# Patient Record
Sex: FEMALE | HISPANIC OR LATINO | Employment: FULL TIME | URBAN - NONMETROPOLITAN AREA
[De-identification: names, ages, dates, MRNs, and addresses within clinical notes are randomized per-mention and may not be internally consistent; named-entity substitution may affect disease eponyms.]

---

## 2017-02-03 ENCOUNTER — OFFICE VISIT (OUTPATIENT)
Dept: RETAIL CLINIC | Facility: CLINIC | Age: 22
End: 2017-02-03

## 2017-02-03 ENCOUNTER — APPOINTMENT (OUTPATIENT)
Dept: CT IMAGING | Facility: HOSPITAL | Age: 22
End: 2017-02-03

## 2017-02-03 ENCOUNTER — HOSPITAL ENCOUNTER (EMERGENCY)
Facility: HOSPITAL | Age: 22
Discharge: HOME | End: 2017-02-03
Admitting: EMERGENCY MEDICINE

## 2017-02-03 VITALS
WEIGHT: 138.4 LBS | BODY MASS INDEX: 27.9 KG/M2 | RESPIRATION RATE: 18 BRPM | HEIGHT: 59 IN | HEART RATE: 100 BPM | DIASTOLIC BLOOD PRESSURE: 64 MMHG | SYSTOLIC BLOOD PRESSURE: 96 MMHG | TEMPERATURE: 100.3 F | OXYGEN SATURATION: 98 %

## 2017-02-03 VITALS
OXYGEN SATURATION: 98 % | WEIGHT: 141 LBS | HEART RATE: 99 BPM | TEMPERATURE: 99 F | DIASTOLIC BLOOD PRESSURE: 52 MMHG | SYSTOLIC BLOOD PRESSURE: 98 MMHG | BODY MASS INDEX: 28.43 KG/M2 | HEIGHT: 59 IN | RESPIRATION RATE: 18 BRPM

## 2017-02-03 DIAGNOSIS — N91.2 AMENORRHEA: ICD-10-CM

## 2017-02-03 DIAGNOSIS — N76.0 ACUTE VAGINITIS: ICD-10-CM

## 2017-02-03 DIAGNOSIS — R10.31 RIGHT LOWER QUADRANT ABDOMINAL PAIN: ICD-10-CM

## 2017-02-03 DIAGNOSIS — J03.00 STREP TONSILLITIS: Primary | ICD-10-CM

## 2017-02-03 DIAGNOSIS — R10.9 RIGHT FLANK PAIN: ICD-10-CM

## 2017-02-03 DIAGNOSIS — J02.0 STREP PHARYNGITIS: Primary | ICD-10-CM

## 2017-02-03 LAB
ALBUMIN SERPL-MCNC: 4.4 G/DL (ref 3.5–5)
ALBUMIN/GLOB SERPL: 1.2 G/DL (ref 1.1–2.5)
ALP SERPL-CCNC: 72 U/L (ref 24–120)
ALT SERPL W P-5'-P-CCNC: 39 U/L (ref 0–54)
AMYLASE SERPL-CCNC: 57 U/L (ref 30–110)
ANION GAP SERPL CALCULATED.3IONS-SCNC: 8 MMOL/L (ref 4–13)
AST SERPL-CCNC: 28 U/L (ref 7–45)
B-HCG UR QL: NEGATIVE
BACTERIA UR QL AUTO: ABNORMAL /HPF
BASOPHILS # BLD AUTO: 0.01 10*3/MM3 (ref 0–0.2)
BASOPHILS NFR BLD AUTO: 0.1 % (ref 0–2)
BILIRUB BLD-MCNC: NEGATIVE MG/DL
BILIRUB SERPL-MCNC: 1.1 MG/DL (ref 0.1–1)
BILIRUB UR QL STRIP: NEGATIVE
BUN BLD-MCNC: 8 MG/DL (ref 5–21)
BUN/CREAT SERPL: 14.8 (ref 7–25)
CALCIUM SPEC-SCNC: 8.9 MG/DL (ref 8.4–10.4)
CHLORIDE SERPL-SCNC: 103 MMOL/L (ref 98–110)
CLARITY UR: CLEAR
CLARITY, POC: CLEAR
CLUE CELLS SPEC QL WET PREP: ABNORMAL
CO2 SERPL-SCNC: 26 MMOL/L (ref 24–31)
COLOR UR: ABNORMAL
COLOR UR: YELLOW
CREAT BLD-MCNC: 0.54 MG/DL (ref 0.5–1.4)
DEPRECATED RDW RBC AUTO: 39.3 FL (ref 40–54)
EOSINOPHIL # BLD AUTO: 0.01 10*3/MM3 (ref 0–0.7)
EOSINOPHIL NFR BLD AUTO: 0.1 % (ref 0–4)
ERYTHROCYTE [DISTWIDTH] IN BLOOD BY AUTOMATED COUNT: 12 % (ref 12–15)
EXPIRATION DATE: ABNORMAL
EXPIRATION DATE: NORMAL
FLUAV AG NPH QL: NEGATIVE
FLUBV AG NPH QL: NEGATIVE
GFR SERPL CREATININE-BSD FRML MDRD: 140 ML/MIN/1.73
GLOBULIN UR ELPH-MCNC: 3.8 GM/DL
GLUCOSE BLD-MCNC: 109 MG/DL (ref 70–100)
GLUCOSE UR STRIP-MCNC: NEGATIVE MG/DL
GLUCOSE UR STRIP-MCNC: NEGATIVE MG/DL
HCG INTACT+B SERPL-ACNC: <2.39 MIU/ML (ref 0–10)
HCT VFR BLD AUTO: 36.8 % (ref 37–47)
HETEROPH AB SER QL LA: NEGATIVE
HGB BLD-MCNC: 12.7 G/DL (ref 12–16)
HGB UR QL STRIP.AUTO: ABNORMAL
HYALINE CASTS UR QL AUTO: ABNORMAL /LPF
HYDATID CYST SPEC WET PREP: ABNORMAL
IMM GRANULOCYTES # BLD: 0.05 10*3/MM3 (ref 0–0.03)
IMM GRANULOCYTES NFR BLD: 0.3 % (ref 0–5)
INTERNAL CONTROL: ABNORMAL
INTERNAL CONTROL: NORMAL
INTERNAL NEGATIVE CONTROL: NEGATIVE
INTERNAL POSITIVE CONTROL: POSITIVE
KETONES UR QL STRIP: ABNORMAL
KETONES UR QL: ABNORMAL
LEUKOCYTE EST, POC: NEGATIVE
LEUKOCYTE ESTERASE UR QL STRIP.AUTO: ABNORMAL
LIPASE SERPL-CCNC: 25 U/L (ref 23–203)
LYMPHOCYTES # BLD AUTO: 1.76 10*3/MM3 (ref 0.72–4.86)
LYMPHOCYTES NFR BLD AUTO: 11.6 % (ref 15–45)
Lab: ABNORMAL
Lab: NORMAL
Lab: NORMAL
MCH RBC QN AUTO: 31.2 PG (ref 28–32)
MCHC RBC AUTO-ENTMCNC: 34.5 G/DL (ref 33–36)
MCV RBC AUTO: 90.4 FL (ref 82–98)
MONOCYTES # BLD AUTO: 0.89 10*3/MM3 (ref 0.19–1.3)
MONOCYTES NFR BLD AUTO: 5.9 % (ref 4–12)
NEUTROPHILS # BLD AUTO: 12.39 10*3/MM3 (ref 1.87–8.4)
NEUTROPHILS NFR BLD AUTO: 82 % (ref 39–78)
NITRITE UR QL STRIP: NEGATIVE
NITRITE UR-MCNC: NEGATIVE MG/ML
PH UR STRIP.AUTO: <=5 [PH] (ref 5–8)
PH UR: 5 [PH] (ref 5–8)
PLATELET # BLD AUTO: 269 10*3/MM3 (ref 130–400)
PMV BLD AUTO: 9.5 FL (ref 6–12)
POTASSIUM BLD-SCNC: 3.4 MMOL/L (ref 3.5–5.3)
PROT SERPL-MCNC: 8.2 G/DL (ref 6.3–8.7)
PROT UR QL STRIP: NEGATIVE
PROT UR STRIP-MCNC: NEGATIVE MG/DL
RBC # BLD AUTO: 4.07 10*6/MM3 (ref 4.2–5.4)
RBC # UR STRIP: ABNORMAL /UL
RBC # UR: ABNORMAL /HPF
REF LAB TEST METHOD: ABNORMAL
S PYO AG THROAT QL: POSITIVE
SODIUM BLD-SCNC: 137 MMOL/L (ref 135–145)
SP GR UR STRIP: 1.02 (ref 1–1.03)
SP GR UR: 1.02 (ref 1–1.03)
SQUAMOUS #/AREA URNS HPF: ABNORMAL /HPF
T VAGINALIS SPEC QL WET PREP: ABNORMAL
UROBILINOGEN UR QL STRIP: ABNORMAL
UROBILINOGEN UR QL: NORMAL
WBC NRBC COR # BLD: 15.11 10*3/MM3 (ref 4.8–10.8)
WBC SPEC QL WET PREP: ABNORMAL
WBC UR QL AUTO: ABNORMAL /HPF
YEAST GENITAL QL WET PREP: ABNORMAL

## 2017-02-03 PROCEDURE — 80053 COMPREHEN METABOLIC PANEL: CPT | Performed by: PHYSICIAN ASSISTANT

## 2017-02-03 PROCEDURE — 25010000002 ONDANSETRON PER 1 MG: Performed by: PHYSICIAN ASSISTANT

## 2017-02-03 PROCEDURE — 87270 CHLAMYDIA TRACHOMATIS AG IF: CPT | Performed by: PHYSICIAN ASSISTANT

## 2017-02-03 PROCEDURE — 82150 ASSAY OF AMYLASE: CPT | Performed by: PHYSICIAN ASSISTANT

## 2017-02-03 PROCEDURE — 87590 N.GONORRHOEAE DNA DIR PROB: CPT | Performed by: PHYSICIAN ASSISTANT

## 2017-02-03 PROCEDURE — 87491 CHLMYD TRACH DNA AMP PROBE: CPT | Performed by: PHYSICIAN ASSISTANT

## 2017-02-03 PROCEDURE — 85025 COMPLETE CBC W/AUTO DIFF WBC: CPT | Performed by: PHYSICIAN ASSISTANT

## 2017-02-03 PROCEDURE — 87880 STREP A ASSAY W/OPTIC: CPT | Performed by: NURSE PRACTITIONER

## 2017-02-03 PROCEDURE — 96365 THER/PROPH/DIAG IV INF INIT: CPT

## 2017-02-03 PROCEDURE — 25010000002 MORPHINE PER 10 MG: Performed by: EMERGENCY MEDICINE

## 2017-02-03 PROCEDURE — 87210 SMEAR WET MOUNT SALINE/INK: CPT | Performed by: PHYSICIAN ASSISTANT

## 2017-02-03 PROCEDURE — 81003 URINALYSIS AUTO W/O SCOPE: CPT | Performed by: NURSE PRACTITIONER

## 2017-02-03 PROCEDURE — 83690 ASSAY OF LIPASE: CPT | Performed by: PHYSICIAN ASSISTANT

## 2017-02-03 PROCEDURE — 96375 TX/PRO/DX INJ NEW DRUG ADDON: CPT

## 2017-02-03 PROCEDURE — 86308 HETEROPHILE ANTIBODY SCREEN: CPT | Performed by: PHYSICIAN ASSISTANT

## 2017-02-03 PROCEDURE — 81025 URINE PREGNANCY TEST: CPT | Performed by: NURSE PRACTITIONER

## 2017-02-03 PROCEDURE — 99283 EMERGENCY DEPT VISIT LOW MDM: CPT

## 2017-02-03 PROCEDURE — 96361 HYDRATE IV INFUSION ADD-ON: CPT

## 2017-02-03 PROCEDURE — 0 IOPAMIDOL 61 % SOLUTION: Performed by: PHYSICIAN ASSISTANT

## 2017-02-03 PROCEDURE — 87804 INFLUENZA ASSAY W/OPTIC: CPT | Performed by: NURSE PRACTITIONER

## 2017-02-03 PROCEDURE — 81001 URINALYSIS AUTO W/SCOPE: CPT | Performed by: PHYSICIAN ASSISTANT

## 2017-02-03 PROCEDURE — 84702 CHORIONIC GONADOTROPIN TEST: CPT | Performed by: PHYSICIAN ASSISTANT

## 2017-02-03 PROCEDURE — 74177 CT ABD & PELVIS W/CONTRAST: CPT

## 2017-02-03 PROCEDURE — 99203 OFFICE O/P NEW LOW 30 MIN: CPT | Performed by: NURSE PRACTITIONER

## 2017-02-03 PROCEDURE — 87591 N.GONORRHOEAE DNA AMP PROB: CPT

## 2017-02-03 RX ORDER — CLINDAMYCIN PHOSPHATE 900 MG/50ML
900 INJECTION INTRAVENOUS EVERY 8 HOURS
Status: DISCONTINUED | OUTPATIENT
Start: 2017-02-03 | End: 2017-02-03 | Stop reason: HOSPADM

## 2017-02-03 RX ORDER — POTASSIUM CHLORIDE 20 MEQ/1
20 TABLET, EXTENDED RELEASE ORAL ONCE
Status: DISCONTINUED | OUTPATIENT
Start: 2017-02-03 | End: 2017-02-03 | Stop reason: CLARIF

## 2017-02-03 RX ORDER — SODIUM CHLORIDE 9 MG/ML
125 INJECTION, SOLUTION INTRAVENOUS CONTINUOUS
Status: DISCONTINUED | OUTPATIENT
Start: 2017-02-03 | End: 2017-02-03 | Stop reason: HOSPADM

## 2017-02-03 RX ORDER — POTASSIUM CHLORIDE 750 MG/1
20 CAPSULE, EXTENDED RELEASE ORAL ONCE
Status: COMPLETED | OUTPATIENT
Start: 2017-02-03 | End: 2017-02-03

## 2017-02-03 RX ORDER — ACETAMINOPHEN AND CODEINE PHOSPHATE 300; 30 MG/1; MG/1
1 TABLET ORAL EVERY 4 HOURS PRN
Qty: 15 TABLET | Refills: 0 | Status: SHIPPED | OUTPATIENT
Start: 2017-02-03 | End: 2017-10-27

## 2017-02-03 RX ORDER — MORPHINE SULFATE 4 MG/ML
4 INJECTION, SOLUTION INTRAMUSCULAR; INTRAVENOUS ONCE
Status: COMPLETED | OUTPATIENT
Start: 2017-02-03 | End: 2017-02-03

## 2017-02-03 RX ORDER — METRONIDAZOLE 7.5 MG/G
GEL VAGINAL EVERY EVENING
Qty: 70 G | Refills: 0 | Status: SHIPPED | OUTPATIENT
Start: 2017-02-03 | End: 2017-10-27

## 2017-02-03 RX ORDER — CLINDAMYCIN HYDROCHLORIDE 300 MG/1
300 CAPSULE ORAL 4 TIMES DAILY
Qty: 40 CAPSULE | Refills: 0 | Status: SHIPPED | OUTPATIENT
Start: 2017-02-03 | End: 2017-10-27

## 2017-02-03 RX ORDER — ONDANSETRON 2 MG/ML
4 INJECTION INTRAMUSCULAR; INTRAVENOUS ONCE
Status: COMPLETED | OUTPATIENT
Start: 2017-02-03 | End: 2017-02-03

## 2017-02-03 RX ADMIN — MORPHINE SULFATE 4 MG: 4 INJECTION, SOLUTION INTRAMUSCULAR; INTRAVENOUS at 15:16

## 2017-02-03 RX ADMIN — CLINDAMYCIN PHOSPHATE 900 MG: 18 INJECTION, SOLUTION INTRAVENOUS at 16:56

## 2017-02-03 RX ADMIN — SODIUM CHLORIDE 1000 ML: 9 INJECTION, SOLUTION INTRAVENOUS at 15:16

## 2017-02-03 RX ADMIN — IOPAMIDOL 100 ML: 612 INJECTION, SOLUTION INTRAVENOUS at 16:23

## 2017-02-03 RX ADMIN — ONDANSETRON 4 MG: 2 INJECTION INTRAMUSCULAR; INTRAVENOUS at 15:16

## 2017-02-03 RX ADMIN — SODIUM CHLORIDE 125 ML/HR: 9 INJECTION, SOLUTION INTRAVENOUS at 15:16

## 2017-02-03 RX ADMIN — POTASSIUM CHLORIDE 20 MEQ: 750 CAPSULE, EXTENDED RELEASE ORAL at 16:56

## 2017-02-03 NOTE — PROGRESS NOTES
Chief Complaint   Patient presents with   • Fever   • Dizziness   • Sore Throat   • Nausea   • Back Pain     Subjective   Makayla Modi is a 21 y.o. female who presents to the clinic today with complaints of back pain which started today. She's had fever and chills the last couple of days. She also complains of low abdominal pain which started today. She states she has had nausea, but no vomiting. She has has nausea off and on for over a year.  She has had a sore throat for over a week. It does hurt to swallow at times. She admits not drinking much fluid recently.  She's had a headache that Advil hasn't helped much. She reports occasional dizziness. She has had no problems walking or talking, no syncopal episodes.      She reports her period is late. Her last period was around the 22nd of December 2016.       The following portions of the patient's history were reviewed and updated as appropriate: allergies, past family history, past medical history, past social history, past surgical history and problem list.      Current Outpatient Prescriptions:   •  IBUPROFEN PO, Take  by mouth As Needed., Disp: , Rfl:   Allergies:  Review of patient's allergies indicates no known allergies.  No past medical history on file.  No past surgical history on file.  No family history on file.  Social History   Substance Use Topics   • Smoking status: Not on file   • Smokeless tobacco: Not on file   • Alcohol use Not on file       Review of Systems  Review of Systems   Constitutional: Positive for chills and fever.   HENT: Positive for postnasal drip and sore throat. Negative for ear pain, sinus pressure, sneezing and trouble swallowing.    Respiratory: Negative for cough, shortness of breath and wheezing.    Cardiovascular: Positive for chest pain (off and on).   Gastrointestinal: Positive for abdominal pain and nausea. Negative for constipation, diarrhea and vomiting.   Genitourinary: Positive for pelvic pain. Negative for dysuria,  "frequency, urgency and vaginal bleeding.   Musculoskeletal: Positive for back pain.   Neurological: Positive for dizziness (off and on) and headaches. Negative for seizures and speech difficulty.       Objective   Visit Vitals   • BP 96/64 (BP Location: Right arm, Patient Position: Sitting, Cuff Size: Adult)   • Pulse 100   • Temp 100.3 °F (37.9 °C) (Oral)   • Resp 18   • Ht 59\" (149.9 cm)   • Wt 138 lb 6.4 oz (62.8 kg)   • LMP 12/22/2016 (Approximate)   • SpO2 98%   • BMI 27.95 kg/m2     Last 2 weights    02/03/17  1252   Weight: 138 lb 6.4 oz (62.8 kg)       Physical Exam   Constitutional: She is oriented to person, place, and time. She appears well-developed and well-nourished. She is cooperative. She appears ill (mildly). No distress.   HENT:   Head: Normocephalic and atraumatic.   Right Ear: Tympanic membrane, external ear and ear canal normal. Tympanic membrane is not perforated, not erythematous, not retracted and not bulging.   Left Ear: External ear and ear canal normal. Tympanic membrane is not perforated, not erythematous, not retracted and not bulging.   Nose: Nose normal. Right sinus exhibits no maxillary sinus tenderness and no frontal sinus tenderness. Left sinus exhibits no maxillary sinus tenderness and no frontal sinus tenderness.   Mouth/Throat: Uvula is midline and mucous membranes are normal. Posterior oropharyngeal erythema present. Tonsils are 2+ on the right. Tonsils are 2+ on the left. Tonsillar exudate (both tonsils).   Eyes: Conjunctivae, EOM and lids are normal. Pupils are equal, round, and reactive to light.   Neck: Trachea normal, normal range of motion and full passive range of motion without pain. Neck supple. No rigidity.   Cardiovascular: Regular rhythm, S1 normal, S2 normal and normal heart sounds.  Tachycardia present.    Pulmonary/Chest: Effort normal and breath sounds normal. She has no wheezes. She has no rhonchi. She has no rales. Chest wall is not dull to percussion. She " exhibits no tenderness.   Abdominal: Normal appearance and bowel sounds are normal. There is tenderness in the right lower quadrant and suprapubic area. There is CVA tenderness.   Lymphadenopathy:     She has no cervical adenopathy.   Neurological: She is alert and oriented to person, place, and time. She displays a negative Romberg sign. Coordination and gait normal.   Skin: Skin is warm, dry and intact.   Psychiatric: She has a normal mood and affect. Her speech is normal and behavior is normal.   Vitals reviewed.      Assessment/Plan     Makayla was seen today for fever, dizziness, sore throat, nausea and back pain.    Diagnoses and all orders for this visit:    Strep tonsillitis  -     POC Rapid Strep A - postive  -     POC Influenza A / B- negative    Amenorrhea  -     POC Pregnancy, Urine- negative    Right flank pain  -     POC Urinalysis Dipstick, Automated    Right lower quadrant abdominal pain      Due to her abdominal pain and flank pain I have recommended she go to ER for further evaluation.  She reports she will go to [facility] ER.  I have notified the charge nurse.  I have not treated her for the strep infection. ER aware. Patient has given me permission to call and check on her later.

## 2017-02-03 NOTE — ED NOTES
PT STATES WAS SEEN AT [facility] IN CLINIC AND DX WITH STREP WAS TOLD TO COME TO THE ER BECAUSE SHE WAS HAVING FLANK PAIN     Smiley Sousa RN  02/03/17 1418

## 2017-02-03 NOTE — ED PROVIDER NOTES
Subjective   Patient is a 21 y.o. female presenting with abdominal pain.   Abdominal Pain   Associated symptoms: chills, fatigue, nausea and sore throat    Associated symptoms: no constipation, no diarrhea and no vomiting      Patient is a pleasant 21-year-old female with chief complaint of not feeling well for one week.  She complains of overall aches, chills, sore throat, abdominal pain, and back pain.  She has headaches and dizziness as well.  She denies any neck pain. she reports her other family members have been ill similar symptoms.  She went to [facility] today and had positive strep swab.  She reports a negative pregnancy test and negative flu swab.  Due to her abdominal pain and back pain, she was advised to the ER for further evaluation.  She has tried Motrin with minimal relief.    The patient denies any dysuria, hematuria, or flank pain.  Her last known menstrual cycle 6 weeks ago.  She denies any abnormal vaginal bleeding or discharge.  She complains of nausea without any vomiting, diarrhea, or constipation.  She describes abdominal pain as sharp and stabbing.  She reports it is constant.  She does complain of decreased appetite.    Review of Systems   Constitutional: Positive for activity change, appetite change, chills and fatigue.   HENT: Positive for sore throat.    Respiratory: Negative.    Gastrointestinal: Positive for abdominal pain and nausea. Negative for constipation, diarrhea and vomiting.   Genitourinary: Negative.    Musculoskeletal: Positive for back pain.   Neurological: Positive for dizziness, weakness and headaches.       History reviewed. No pertinent past medical history.    No Known Allergies    History reviewed. No pertinent past surgical history.    History reviewed. No pertinent family history.    Social History     Social History   • Marital status: Single     Spouse name: N/A   • Number of children: N/A   • Years of education: N/A     Social History Main Topics   • Smoking  "status: Never Smoker   • Smokeless tobacco: None   • Alcohol use No   • Drug use: No   • Sexual activity: Defer     Other Topics Concern   • None     Social History Narrative   • None       Prior to Admission medications    Medication Sig Start Date End Date Taking? Authorizing Provider   IBUPROFEN PO Take  by mouth As Needed.   Yes Historical Provider, MD       Medications   sodium chloride 0.9 % infusion (125 mL/hr Intravenous New Bag 2/3/17 1516)   clindamycin (CLEOCIN) 900 mg in dextrose 5% 50 mL IVPB (premix) (900 mg Intravenous New Bag 2/3/17 1656)   sodium chloride 0.9 % bolus 1,000 mL (1,000 mL Intravenous New Bag 2/3/17 1516)   ondansetron (ZOFRAN) injection 4 mg (4 mg Intravenous Given 2/3/17 1516)   Morphine sulfate (PF) injection 4 mg (4 mg Intravenous Given 2/3/17 1516)   iopamidol (ISOVUE-300) 61 % injection 100 mL (100 mL Intravenous Given 2/3/17 1623)   potassium chloride (MICRO-K) CR capsule 20 mEq (20 mEq Oral Given 2/3/17 1656)       Visit Vitals   • BP 97/62 (BP Location: Right arm, Patient Position: Sitting)   • Pulse 106   • Temp 100.2 °F (37.9 °C) (Temporal Artery )   • Resp 16   • Ht 59\" (149.9 cm)   • Wt 141 lb (64 kg)   • LMP 12/22/2016 (Approximate)   • SpO2 98%   • BMI 28.48 kg/m2         Objective   Physical Exam   Constitutional: She is oriented to person, place, and time. She appears well-developed and well-nourished. No distress.   HENT:   Head: Normocephalic and atraumatic.   Mouth/Throat: Oropharyngeal exudate, posterior oropharyngeal edema and posterior oropharyngeal erythema present. No tonsillar abscesses.   Fluid behind both TM   Eyes: Conjunctivae and EOM are normal. Pupils are equal, round, and reactive to light.   Neck: Normal range of motion. Neck supple. No spinous process tenderness and no muscular tenderness present. No rigidity. No tracheal deviation, no edema, no erythema and normal range of motion present. No Brudzinski's sign and no Kernig's sign noted. "   Cardiovascular: Normal rate, regular rhythm, normal heart sounds and intact distal pulses.    No murmur heard.  Pulmonary/Chest: Effort normal and breath sounds normal.   Abdominal: Soft. Bowel sounds are normal. She exhibits no distension and no mass. There is tenderness in the right lower quadrant. There is CVA tenderness. There is no rebound and no guarding.       Genitourinary: Vagina normal and uterus normal. Pelvic exam was performed with patient supine. Cervix exhibits discharge and friability.   Genitourinary Comments: Extensive white to yellow discharge.  No odor noted.  No masses or lesions noted.   Musculoskeletal: Normal range of motion. She exhibits no edema.   Neurological: She is alert and oriented to person, place, and time.   Skin: Skin is warm and dry. She is not diaphoretic.   Psychiatric: She has a normal mood and affect. Her behavior is normal.   Nursing note and vitals reviewed.      Procedures         Lab Results (last 24 hours)     Procedure Component Value Units Date/Time    POC Rapid Strep A [[id number]]  (Abnormal) Collected:  02/03/17 1250    Specimen:  Other Updated:  02/03/17 1334     Rapid Strep A Screen Positive (A)      Internal Control Passed      Lot Number [id number]      Expiration Date 2018/7     POC Influenza A / B [[id number]]  (Normal) Collected:  02/03/17 1255    Specimen:  Swab Updated:  02/03/17 1336     Rapid Influenza A Ag negative      Rapid Influenza B Ag negative      Internal Control Passed      Lot Number [id number]      Expiration Date 2017-08-07     POC Pregnancy, Urine [[id number]]  (Normal) Collected:  02/03/17 1255    Specimen:  Urine Updated:  02/03/17 1349     HCG, Urine, QL Negative      Lot Number [id number]      Internal Positive Control Positive      Internal Negative Control Negative     POC Urinalysis Dipstick, Automated [[id number]]  (Abnormal) Collected:  02/03/17 1305    Specimen:  Urine Updated:  02/03/17 1338     Color Chitra      Clarity, UA Clear      Glucose,  UA Negative mg/dL      Bilirubin Negative      Ketones, UA Trace (A)       5mg/dL        Specific Gravity  1.025      Blood, UA 50 Jeb/ul (A)      pH, Urine 5.0      Protein, POC Negative mg/dL      Urobilinogen, UA Normal      Leukocytes Negative      Nitrite, UA Negative     CBC & Differential [[id number]] Collected:  02/03/17 1512    Specimen:  Blood Updated:  02/03/17 1521    Narrative:       The following orders were created for panel order CBC & Differential.  Procedure                               Abnormality         Status                     ---------                               -----------         ------                     CBC Auto Differential[[id number]]         Abnormal            Final result                 Please view results for these tests on the individual orders.    Comprehensive Metabolic Panel [[id number]]  (Abnormal) Collected:  02/03/17 1512    Specimen:  Blood Updated:  02/03/17 1533     Glucose 109 (H) mg/dL      BUN 8 mg/dL      Creatinine 0.54 mg/dL      Sodium 137 mmol/L      Potassium 3.4 (L) mmol/L      Chloride 103 mmol/L      CO2 26.0 mmol/L      Calcium 8.9 mg/dL      Total Protein 8.2 g/dL      Albumin 4.40 g/dL      ALT (SGPT) 39 U/L      AST (SGOT) 28 U/L      Alkaline Phosphatase 72 U/L      Total Bilirubin 1.1 (H) mg/dL      eGFR Non African Amer 140 mL/min/1.73      Globulin 3.8 gm/dL      A/G Ratio 1.2 g/dL      BUN/Creatinine Ratio 14.8      Anion Gap 8.0 mmol/L     Urinalysis With / Culture If Indicated [[id number]]  (Abnormal) Collected:  02/03/17 1512    Specimen:  Urine from Urine, Catheter Updated:  02/03/17 1526     Color, UA Yellow      Appearance, UA Clear      pH, UA <=5.0      Specific Gravity, UA 1.021      Glucose, UA Negative      Ketones, UA 15 mg/dL (1+) (A)      Bilirubin, UA Negative      Blood, UA Moderate (2+) (A)      Protein, UA Negative      Leuk Esterase, UA Trace (A)      Nitrite, UA Negative      Urobilinogen, UA 0.2 E.U./dL     hCG, Quantitative,  Pregnancy [[id number]]  (Normal) Collected:  02/03/17 1512    Specimen:  Blood Updated:  02/03/17 1549     HCG Quantitative <2.39 mIU/mL     Amylase [[id number]]  (Normal) Collected:  02/03/17 1512    Specimen:  Blood Updated:  02/03/17 1533     Amylase 57 U/L     Lipase [[id number]]  (Normal) Collected:  02/03/17 1512    Specimen:  Blood Updated:  02/03/17 1533     Lipase 25 U/L     Mononucleosis Screen [[id number]]  (Normal) Collected:  02/03/17 1512    Specimen:  Blood Updated:  02/03/17 1559     Monospot Negative     CBC Auto Differential [[id number]]  (Abnormal) Collected:  02/03/17 1512    Specimen:  Blood Updated:  02/03/17 1521     WBC 15.11 (H) 10*3/mm3      RBC 4.07 (L) 10*6/mm3      Hemoglobin 12.7 g/dL      Hematocrit 36.8 (L) %      MCV 90.4 fL      MCH 31.2 pg      MCHC 34.5 g/dL      RDW 12.0 %      RDW-SD 39.3 (L) fl      MPV 9.5 fL      Platelets 269 10*3/mm3      Neutrophil % 82.0 (H) %      Lymphocyte % 11.6 (L) %      Monocyte % 5.9 %      Eosinophil % 0.1 %      Basophil % 0.1 %      Immature Grans % 0.3 %      Neutrophils, Absolute 12.39 (H) 10*3/mm3      Lymphocytes, Absolute 1.76 10*3/mm3      Monocytes, Absolute 0.89 10*3/mm3      Eosinophils, Absolute 0.01 10*3/mm3      Basophils, Absolute 0.01 10*3/mm3      Immature Grans, Absolute 0.05 (H) 10*3/mm3     Urinalysis, Microscopic Only [[id number]]  (Abnormal) Collected:  02/03/17 1512    Specimen:  Urine from Urine, Catheter Updated:  02/03/17 1526     RBC, UA 0-2 (A) /HPF      WBC, UA 0-2 (A) /HPF      Bacteria, UA None Seen /HPF      Squamous Epithelial Cells, UA 3-6 (A) /HPF      Hyaline Casts, UA 0-2 /LPF      Methodology Automated Microscopy     Chlamydia Trachomatis, DFA [[id number]] Collected:  02/03/17 1657    Specimen:  Swab from Cervix Updated:  02/03/17 1714    Wet Prep, Genital [[id number]]  (Abnormal) Collected:  02/03/17 1714    Specimen:  Swab from Vagina Updated:  02/03/17 1727     YEAST No yeast seen      HYPHAL ELEMENTS No Hyphal elements  seen      WBC'S 4+ WBC's seen (A)      Clue Cells, Wet Prep No Clue cells seen      Trichomonas, Wet Prep No Trichomonas seen           Ct Abdomen Pelvis With Contrast    Result Date: 2/3/2017  Narrative: CT ABDOMEN PELVIS W CONTRAST- 2/3/2017 16:09 CST  HISTORY: Right-sided abdominal pain.  COMPARISON: None.  DOSE LENGTH PRODUCT: 490 mGy cm. Automated exposure control was also utilized to decrease patient radiation dose.  TECHNIQUE: Axial images of the abdomen and pelvis are obtained following IV contrast. No oral contrast is administered.  FINDINGS:  There is no suspicious liver or splenic lesion. There is no pancreatic cyst or mass. The gallbladder and the adrenal glands are unremarkable. There is no enhancing renal mass. There is no hydronephrosis. The bladder is underdistended.  There is a prominent hypodense appearance to the cervix. Cervical nabothian cysts are likely present. Hypodense appearance to the endometrium may relate to the secretory phase of menstruation. The presence of endometrial fluid is considered. Follicles are seen within the ovaries.  There is no free intraperitoneal air or loculated abscess. There is a normal appendix. No pathologic lymphadenopathy is identified. The abdominal aorta is normal in caliber.  The lung bases are clear.      Impression: 1. There is a prominent hypodense appearance to the cervix, which may also contain a few cervical nabothian cysts. Clinical examination of the cervix is recommended, as a mass or lesion is difficult to exclude. Hypodense endometrium may relate to the secretory phase of menstruation or the presence of endometrial fluid. No adnexal pathology. 2. Normal appendix. 3. No free air or abscess. This report was finalized on 02/03/2017 16:38 by Dr. Michelle Chavis MD.      ED Course  ED Course          MDM    Final diagnoses:   Strep pharyngitis   Acute vaginitis          claudetteZARIA Oneal  02/03/17 1499

## 2017-02-10 LAB
C TRACH RRNA SPEC DONR QL NAA+PROBE: NEGATIVE
N GONORRHOEA DNA SPEC QL NAA+PROBE: NEGATIVE
SPECIMEN STATUS: NORMAL

## 2017-04-22 ENCOUNTER — APPOINTMENT (OUTPATIENT)
Dept: GENERAL RADIOLOGY | Facility: HOSPITAL | Age: 22
End: 2017-04-22

## 2017-04-22 ENCOUNTER — HOSPITAL ENCOUNTER (EMERGENCY)
Facility: HOSPITAL | Age: 22
Discharge: HOME | End: 2017-04-22
Attending: FAMILY MEDICINE | Admitting: FAMILY MEDICINE

## 2017-04-22 VITALS
DIASTOLIC BLOOD PRESSURE: 61 MMHG | OXYGEN SATURATION: 100 % | HEART RATE: 59 BPM | SYSTOLIC BLOOD PRESSURE: 102 MMHG | TEMPERATURE: 98 F | RESPIRATION RATE: 16 BRPM | WEIGHT: 140 LBS | HEIGHT: 59 IN | BODY MASS INDEX: 28.22 KG/M2

## 2017-04-22 DIAGNOSIS — R07.9 CHEST PAIN, UNSPECIFIED TYPE: Primary | ICD-10-CM

## 2017-04-22 DIAGNOSIS — R07.89 CHEST WALL PAIN: ICD-10-CM

## 2017-04-22 LAB
ANION GAP SERPL CALCULATED.3IONS-SCNC: 13 MMOL/L (ref 4–13)
BASOPHILS # BLD AUTO: 0.02 10*3/MM3 (ref 0–0.2)
BASOPHILS NFR BLD AUTO: 0.2 % (ref 0–2)
BUN BLD-MCNC: 14 MG/DL (ref 5–21)
BUN/CREAT SERPL: 20.9 (ref 7–25)
CALCIUM SPEC-SCNC: 9 MG/DL (ref 8.4–10.4)
CHLORIDE SERPL-SCNC: 102 MMOL/L (ref 98–110)
CO2 SERPL-SCNC: 23 MMOL/L (ref 24–31)
CREAT BLD-MCNC: 0.67 MG/DL (ref 0.5–1.4)
D DIMER PPP FEU-MCNC: 0.27 MG/L (FEU) (ref 0–0.5)
DEPRECATED RDW RBC AUTO: 38.7 FL (ref 40–54)
EOSINOPHIL # BLD AUTO: 0.1 10*3/MM3 (ref 0–0.7)
EOSINOPHIL NFR BLD AUTO: 1.1 % (ref 0–4)
ERYTHROCYTE [DISTWIDTH] IN BLOOD BY AUTOMATED COUNT: 12.1 % (ref 12–15)
GFR SERPL CREATININE-BSD FRML MDRD: 109 ML/MIN/1.73
GLUCOSE BLD-MCNC: 96 MG/DL (ref 70–100)
HCT VFR BLD AUTO: 37.2 % (ref 37–47)
HGB BLD-MCNC: 13.1 G/DL (ref 12–16)
IMM GRANULOCYTES # BLD: 0.02 10*3/MM3 (ref 0–0.03)
IMM GRANULOCYTES NFR BLD: 0.2 % (ref 0–5)
LYMPHOCYTES # BLD AUTO: 4.03 10*3/MM3 (ref 0.72–4.86)
LYMPHOCYTES NFR BLD AUTO: 44.3 % (ref 15–45)
MCH RBC QN AUTO: 31 PG (ref 28–32)
MCHC RBC AUTO-ENTMCNC: 35.2 G/DL (ref 33–36)
MCV RBC AUTO: 88.2 FL (ref 82–98)
MONOCYTES # BLD AUTO: 0.58 10*3/MM3 (ref 0.19–1.3)
MONOCYTES NFR BLD AUTO: 6.4 % (ref 4–12)
NEUTROPHILS # BLD AUTO: 4.35 10*3/MM3 (ref 1.87–8.4)
NEUTROPHILS NFR BLD AUTO: 47.8 % (ref 39–78)
PLATELET # BLD AUTO: 331 10*3/MM3 (ref 130–400)
PMV BLD AUTO: 10.3 FL (ref 6–12)
POTASSIUM BLD-SCNC: 4.2 MMOL/L (ref 3.5–5.3)
RBC # BLD AUTO: 4.22 10*6/MM3 (ref 4.2–5.4)
SODIUM BLD-SCNC: 138 MMOL/L (ref 135–145)
WBC NRBC COR # BLD: 9.1 10*3/MM3 (ref 4.8–10.8)

## 2017-04-22 PROCEDURE — 25010000002 KETOROLAC TROMETHAMINE PER 15 MG: Performed by: FAMILY MEDICINE

## 2017-04-22 PROCEDURE — 85025 COMPLETE CBC W/AUTO DIFF WBC: CPT | Performed by: FAMILY MEDICINE

## 2017-04-22 PROCEDURE — 93010 ELECTROCARDIOGRAM REPORT: CPT | Performed by: INTERNAL MEDICINE

## 2017-04-22 PROCEDURE — 80048 BASIC METABOLIC PNL TOTAL CA: CPT | Performed by: FAMILY MEDICINE

## 2017-04-22 PROCEDURE — 93005 ELECTROCARDIOGRAM TRACING: CPT

## 2017-04-22 PROCEDURE — 85379 FIBRIN DEGRADATION QUANT: CPT | Performed by: FAMILY MEDICINE

## 2017-04-22 PROCEDURE — 71010 HC CHEST PA OR AP: CPT

## 2017-04-22 PROCEDURE — 99284 EMERGENCY DEPT VISIT MOD MDM: CPT

## 2017-04-22 PROCEDURE — 96374 THER/PROPH/DIAG INJ IV PUSH: CPT

## 2017-04-22 RX ORDER — ACETAMINOPHEN AND CODEINE PHOSPHATE 300; 30 MG/1; MG/1
1 TABLET ORAL EVERY 4 HOURS PRN
Status: DISCONTINUED | OUTPATIENT
Start: 2017-04-22 | End: 2017-04-22 | Stop reason: HOSPADM

## 2017-04-22 RX ORDER — KETOROLAC TROMETHAMINE 30 MG/ML
30 INJECTION, SOLUTION INTRAMUSCULAR; INTRAVENOUS ONCE
Status: COMPLETED | OUTPATIENT
Start: 2017-04-22 | End: 2017-04-22

## 2017-04-22 RX ADMIN — KETOROLAC TROMETHAMINE 30 MG: 30 INJECTION, SOLUTION INTRAMUSCULAR at 04:17

## 2017-04-22 RX ADMIN — ACETAMINOPHEN AND CODEINE PHOSPHATE 1 TABLET: 300; 30 TABLET ORAL at 04:52

## 2017-04-22 NOTE — ED NOTES
Pt states generalized CP x 1 week, pt states pain is worse and sharp with inspiration. No apparent distress noted upon assessment.     Kary Nicole RN  04/22/17 1668

## 2017-04-22 NOTE — ED PROVIDER NOTES
Subjective   HPI Comments: Patient presents tonight for chest pain.  She states that is been going off and on for about one week.  She states that she sometimes when it is really sharp and it hurts that she gets short of breath.  She has had nausea with this but does not have any nausea currently.  Patient has not had any treatments for this she is not tried any over-the-counter medications tonight though it was there again and it scared her.  She states that she still has the pain it is approximately 8 out of 10 at times most the time 5-6 out of 10.  Patient states nothing makes it worse and nothing makes it better.  Patient denies any vomiting she also denies being on birth control and finally she is not a smoker nor is she exposed to smoke.  Finally patient also denies any clotting disorders within her family.      History provided by:  Patient   used: No        Review of Systems   Constitutional: Negative.    HENT: Negative.    Eyes: Negative.    Respiratory: Negative.    Cardiovascular: Positive for chest pain.   Gastrointestinal: Negative.    Endocrine: Negative.    Genitourinary: Negative.    Musculoskeletal: Negative.    Skin: Negative.    Allergic/Immunologic: Negative.    Neurological: Negative.    Hematological: Negative.    Psychiatric/Behavioral: Negative.    All other systems reviewed and are negative.      History reviewed. No pertinent past medical history.    No Known Allergies    History reviewed. No pertinent surgical history.    History reviewed. No pertinent family history.    Social History     Social History   • Marital status: Single     Spouse name: N/A   • Number of children: N/A   • Years of education: N/A     Social History Main Topics   • Smoking status: Never Smoker   • Smokeless tobacco: None   • Alcohol use No   • Drug use: No   • Sexual activity: Defer     Other Topics Concern   • None     Social History Narrative       Lab Results (last 24 hours)     Procedure  Component Value Units Date/Time    CBC & Differential [[id number]] Collected:  04/22/17 0417    Specimen:  Blood Updated:  04/22/17 0457    Narrative:       The following orders were created for panel order CBC & Differential.  Procedure                               Abnormality         Status                     ---------                               -----------         ------                     CBC Auto Differential[[id number]]         Abnormal            Final result                 Please view results for these tests on the individual orders.    Basic Metabolic Panel [[id number]]  (Abnormal) Collected:  04/22/17 0417    Specimen:  Blood from Arm, Left Updated:  04/22/17 0449     Glucose 96 mg/dL      BUN 14 mg/dL       Specimen hemolyzed. Results may be affected.        Creatinine 0.67 mg/dL      Sodium 138 mmol/L      Potassium 4.2 mmol/L       Specimen hemolyzed.  Results may be affected.        Chloride 102 mmol/L      CO2 23.0 (L) mmol/L      Calcium 9.0 mg/dL      eGFR Non African Amer 109 mL/min/1.73      BUN/Creatinine Ratio 20.9     Anion Gap 13.0 mmol/L     Narrative:       GFR Normal >60  Chronic Kidney Disease <60  Kidney Failure <15    D-dimer, Quantitative [[id number]]  (Normal) Collected:  04/22/17 0417    Specimen:  Blood from Arm, Left Updated:  04/22/17 0513     D-Dimer, Quantitative 0.27 mg/L (FEU)     Narrative:       Reference Range is 0-0.50 mg/L FEU. However, results <0.50 mg/L FEU tends to rule out DVT or PE. Results >0.50 mg/L FEU are not useful in predicting absence or presence of DVT or PE.    CBC Auto Differential [[id number]]  (Abnormal) Collected:  04/22/17 0417    Specimen:  Blood from Arm, Left Updated:  04/22/17 0457     WBC 9.10 10*3/mm3      RBC 4.22 10*6/mm3      Hemoglobin 13.1 g/dL      Hematocrit 37.2 %      MCV 88.2 fL      MCH 31.0 pg      MCHC 35.2 g/dL      RDW 12.1 %      RDW-SD 38.7 (L) fl      MPV 10.3 fL      Platelets 331 10*3/mm3      Neutrophil % 47.8 %      Lymphocyte %  "44.3 %      Monocyte % 6.4 %      Eosinophil % 1.1 %      Basophil % 0.2 %      Immature Grans % 0.2 %      Neutrophils, Absolute 4.35 10*3/mm3      Lymphocytes, Absolute 4.03 10*3/mm3      Monocytes, Absolute 0.58 10*3/mm3      Eosinophils, Absolute 0.10 10*3/mm3      Basophils, Absolute 0.02 10*3/mm3      Immature Grans, Absolute 0.02 10*3/mm3           Objective   Physical Exam   Constitutional: She is oriented to person, place, and time. She appears well-developed and well-nourished.   HENT:   Head: Normocephalic and atraumatic.   Eyes: Conjunctivae and EOM are normal. Pupils are equal, round, and reactive to light.   Neck: Normal range of motion. Neck supple.   Cardiovascular: Normal rate, regular rhythm, normal heart sounds and intact distal pulses.    Pulmonary/Chest: Effort normal and breath sounds normal. She exhibits tenderness.   Abdominal: Soft. Bowel sounds are normal.   Neurological: She is alert and oriented to person, place, and time.   Skin: Skin is warm and dry.   Psychiatric: She has a normal mood and affect. Her behavior is normal.   Nursing note and vitals reviewed.      Procedures         XR Chest 1 View    (Results Pending)       BP 97/65  Pulse 65  Temp 98.4 °F (36.9 °C) (Temporal Artery )   Resp 16  Ht 59\" (149.9 cm)  Wt 140 lb (63.5 kg)  SpO2 99%  BMI 28.28 kg/m2    ED Course    ED Course       Medications   acetaminophen-codeine (TYLENOL #3) 300-30 MG per tablet 1 tablet (1 tablet Oral Given 4/22/17 0808)   ketorolac (TORADOL) injection 30 mg (30 mg Intravenous Given 4/22/17 9532)            MDM  Number of Diagnoses or Management Options  Chest pain, unspecified type: new and requires workup  Chest wall pain: new and requires workup     Amount and/or Complexity of Data Reviewed  Clinical lab tests: ordered and reviewed  Tests in the radiology section of CPT®: ordered and reviewed  Tests in the medicine section of CPT®: ordered and reviewed  Decide to obtain previous medical records " or to obtain history from someone other than the patient: yes  Review and summarize past medical records: yes  Independent visualization of images, tracings, or specimens: yes    Risk of Complications, Morbidity, and/or Mortality  Presenting problems: moderate  Diagnostic procedures: moderate  Management options: moderate    Patient Progress  Patient progress: improved      Final diagnoses:   Chest pain, unspecified type   Chest wall pain          Crystal Coker DO  04/22/17 0556

## 2017-10-27 ENCOUNTER — OFFICE VISIT (OUTPATIENT)
Dept: RETAIL CLINIC | Facility: CLINIC | Age: 22
End: 2017-10-27

## 2017-10-27 VITALS
OXYGEN SATURATION: 99 % | SYSTOLIC BLOOD PRESSURE: 100 MMHG | HEART RATE: 79 BPM | DIASTOLIC BLOOD PRESSURE: 68 MMHG | TEMPERATURE: 98.2 F

## 2017-10-27 DIAGNOSIS — Z63.79 STRESSFUL LIFE EVENT AFFECTING FAMILY: ICD-10-CM

## 2017-10-27 DIAGNOSIS — N94.6 DYSMENORRHEA: Primary | ICD-10-CM

## 2017-10-27 PROCEDURE — 99213 OFFICE O/P EST LOW 20 MIN: CPT | Performed by: NURSE PRACTITIONER

## 2017-10-27 NOTE — PROGRESS NOTES
Subjective   Makayla Modi is a 21 y.o. female.     Abdominal Cramping   This is a new problem. The current episode started today. The problem has been gradually worsening. The pain is located in the generalized abdominal region. The quality of the pain is cramping. Pertinent negatives include no fever, headaches, nausea or vomiting. Nothing aggravates the pain. Relieved by: Midol. Treatments tried: Midol. The treatment provided moderate relief.    Currently on menses.    Patient states she is not sick.  Patient's grandmother is sick and this is causing emotional upset for patient. Emotions are worse with menstruating.  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  Work send her home for the day but requested for her to get a doctor's note.  Patient states she doesn't want her parents to see her crying and upset.     The following portions of the patient's history were reviewed and updated as appropriate: allergies, current medications, past family history, past medical history, past social history, past surgical history and problem list.    Review of Systems   Constitutional: Negative for fever.   HENT: Negative.    Eyes: Negative.    Respiratory: Negative.    Cardiovascular: Negative.    Gastrointestinal: Negative.  Negative for nausea and vomiting.   Neurological: Negative for headaches.       Objective   Physical Exam   Constitutional: She appears well-developed and well-nourished. No distress.   Tearful when discussing Grandmother and life stresses.    HENT:   Right Ear: External ear normal. Tympanic membrane is not erythematous and not bulging.   Left Ear: External ear normal. Tympanic membrane is not erythematous and not bulging.   Mouth/Throat: Oropharynx is clear and moist. No posterior oropharyngeal erythema.   Neck: Neck supple.   Cardiovascular: Normal rate, regular rhythm and normal heart sounds.  Exam reveals no gallop and no  friction rub.    No murmur heard.  Pulmonary/Chest: Effort normal and breath sounds normal. No respiratory distress. She has no wheezes. She has no rales.   Abdominal: Soft. Bowel sounds are normal. She exhibits no distension. There is no tenderness. There is no guarding.   Lymphadenopathy:     She has no cervical adenopathy.   Neurological: She is alert.   Skin: Skin is warm and dry. She is not diaphoretic.   Psychiatric: She has a normal mood and affect. Her speech is normal and behavior is normal. Thought content normal. Her mood appears not anxious. She is attentive.       Assessment/Plan   Makayla was seen today for abdominal cramping.    Diagnoses and all orders for this visit:    Dysmenorrhea    Stressful life event affecting family      Encouraged patient not to keep emotions all to herself and to find someone to talk to.  Asked patient to return to office anytime even if just to talk.  Will give patient today and tomorrow off of work.  Patient plans to return back on Sunday.

## 2017-10-27 NOTE — PATIENT INSTRUCTIONS
Dysmenorrhea  Menstrual cramps (dysmenorrhea) are caused by the muscles of the uterus tightening (delio) during a menstrual period. For some women, this discomfort is merely bothersome. For others, dysmenorrhea can be severe enough to interfere with everyday activities for a few days each month.  Primary dysmenorrhea is menstrual cramps that last a couple of days when you start having menstrual periods or soon after. This often begins after a teenager starts having her period. As a woman gets older or has a baby, the cramps will usually lessen or disappear. Secondary dysmenorrhea begins later in life, lasts longer, and the pain may be stronger than primary dysmenorrhea. The pain may start before the period and last a few days after the period.   CAUSES   Dysmenorrhea is usually caused by an underlying problem, such as:  · The tissue lining the uterus grows outside of the uterus in other areas of the body (endometriosis).  · The endometrial tissue, which normally lines the uterus, is found in or grows into the muscular walls of the uterus (adenomyosis).  · The pelvic blood vessels are engorged with blood just before the menstrual period (pelvic congestive syndrome).  · Overgrowth of cells (polyps) in the lining of the uterus or cervix.  · Falling down of the uterus (prolapse) because of loose or stretched ligaments.  · Depression.  · Bladder problems, infection, or inflammation.  · Problems with the intestine, a tumor, or irritable bowel syndrome.  · Cancer of the female organs or bladder.  · A severely tipped uterus.  · A very tight opening or closed cervix.  · Noncancerous tumors of the uterus (fibroids).  · Pelvic inflammatory disease (PID).  · Pelvic scarring (adhesions) from a previous surgery.  · Ovarian cyst.  · An intrauterine device (IUD) used for birth control.  RISK FACTORS  You may be at greater risk of dysmenorrhea if:  · You are younger than age 30.  · You started puberty early.  · You have  irregular or heavy bleeding.  · You have never given birth.  · You have a family history of this problem.  · You are a smoker.  SIGNS AND SYMPTOMS   · Cramping or throbbing pain in your lower abdomen.  · Headaches.  · Lower back pain.  · Nausea or vomiting.  · Diarrhea.  · Sweating or dizziness.  · Loose stools.  DIAGNOSIS   A diagnosis is based on your history, symptoms, physical exam, diagnostic tests, or procedures. Diagnostic tests or procedures may include:  · Blood tests.  · Ultrasonography.  · An examination of the lining of the uterus (dilation and curettage, D&C).  · An examination inside your abdomen or pelvis with a scope (laparoscopy).  · X-rays.  · CT scan.  · MRI.  · An examination inside the bladder with a scope (cystoscopy).  · An examination inside the intestine or stomach with a scope (colonoscopy, gastroscopy).  TREATMENT   Treatment depends on the cause of the dysmenorrhea. Treatment may include:  · Pain medicine prescribed by your health care provider.  · Birth control pills or an IUD with progesterone hormone in it.  · Hormone replacement therapy.  · Nonsteroidal anti-inflammatory drugs (NSAIDs). These may help stop the production of prostaglandins.  · Surgery to remove adhesions, endometriosis, ovarian cyst, or fibroids.  · Removal of the uterus (hysterectomy).  · Progesterone shots to stop the menstrual period.  · Cutting the nerves on the sacrum that go to the female organs (presacral neurectomy).  · Electric current to the sacral nerves (sacral nerve stimulation).  · Antidepressant medicine.  · Psychiatric therapy, counseling, or group therapy.  · Exercise and physical therapy.  · Meditation and yoga therapy.  · Acupuncture.  HOME CARE INSTRUCTIONS   · Only take over-the-counter or prescription medicines as directed by your health care provider.  · Place a heating pad or hot water bottle on your lower back or abdomen. Do not sleep with the heating pad.  · Use aerobic exercises, walking,  swimming, biking, and other exercises to help lessen the cramping.  · Massage to the lower back or abdomen may help.  · Stop smoking.  · Avoid alcohol and caffeine.  SEEK MEDICAL CARE IF:   · Your pain does not get better with medicine.  · You have pain with sexual intercourse.  · Your pain increases and is not controlled with medicines.  · You have abnormal vaginal bleeding with your period.  · You develop nausea or vomiting with your period that is not controlled with medicine.  SEEK IMMEDIATE MEDICAL CARE IF:   You pass out.      This information is not intended to replace advice given to you by your health care provider. Make sure you discuss any questions you have with your health care provider.     Document Released: 12/18/2006 Document Revised: 08/20/2014 Document Reviewed: 06/05/2014  [employer] Interactive Patient Education ©2017 [employer]

## 2019-02-21 PROCEDURE — 87591 N.GONORRHOEAE DNA AMP PROB: CPT | Performed by: OBSTETRICS & GYNECOLOGY

## 2019-02-21 PROCEDURE — 87624 HPV HI-RISK TYP POOLED RSLT: CPT | Performed by: OBSTETRICS & GYNECOLOGY

## 2019-02-21 PROCEDURE — 87491 CHLMYD TRACH DNA AMP PROBE: CPT | Performed by: OBSTETRICS & GYNECOLOGY

## 2019-02-21 PROCEDURE — G0123 SCREEN CERV/VAG THIN LAYER: HCPCS | Performed by: OBSTETRICS & GYNECOLOGY

## 2019-02-22 ENCOUNTER — LAB REQUISITION (OUTPATIENT)
Dept: LAB | Facility: HOSPITAL | Age: 24
End: 2019-02-22

## 2019-02-22 DIAGNOSIS — Z12.72 ENCOUNTER FOR SCREENING FOR MALIGNANT NEOPLASM OF VAGINA: ICD-10-CM

## 2019-02-25 LAB
C TRACH RRNA CVX QL NAA+PROBE: NOT DETECTED
GEN CATEG CVX/VAG CYTO-IMP: ABNORMAL
LAB AP CASE REPORT: ABNORMAL
LAB AP GYN ADDITIONAL INFORMATION: ABNORMAL
LAB AP GYN OTHER FINDINGS: ABNORMAL
N GONORRHOEA RRNA SPEC QL NAA+PROBE: NOT DETECTED
PATH INTERP SPEC-IMP: ABNORMAL
STAT OF ADQ CVX/VAG CYTO-IMP: ABNORMAL

## 2019-03-11 LAB — HPV I/H RISK 4 DNA CVX QL PROBE+SIG AMP: NOT DETECTED

## 2019-04-26 ENCOUNTER — NURSE TRIAGE (OUTPATIENT)
Dept: CALL CENTER | Facility: HOSPITAL | Age: 24
End: 2019-04-26

## 2019-04-27 NOTE — TELEPHONE ENCOUNTER
"    Reason for Disposition  • General information question, no triage required and triager able to answer question    Additional Information  • Negative: [1] Caller is not with the adult (patient) AND [2] reporting urgent symptoms  • Negative: Lab result questions  • Negative: Medication questions  • Negative: Caller cannot be reached by phone  • Negative: Caller has already spoken to PCP or another triager  • Negative: RN needs further essential information from caller in order to complete triage  • Negative: Requesting regular office appointment  • Negative: [1] Caller requesting NON-URGENT health information AND [2] PCP's office is the best resource  • Negative: Health Information question, no triage required and triager able to answer question  • Negative: Question about upcoming scheduled test, no triage required and triager able to answer question  • Negative: [1] Caller is not with the adult (patient) AND [2] probable NON-URGENT symptoms  • Negative: [1] Follow-up call to recent contact AND [2] information only call, no triage required    Answer Assessment - Initial Assessment Questions  1. REASON FOR CALL or QUESTION: \"What is your reason for calling today?\" or \"How can I best help you?\" or \"What question do you have that I can help answer?\"      Last period 2/3/19; wanting to know where she can get a pregnancy test done on a Saturday and how much it would cost.  She has had 3 negative home pregnancy tests.    Protocols used: INFORMATION ONLY CALL-ADULT-      "